# Patient Record
(demographics unavailable — no encounter records)

---

## 2017-02-02 NOTE — ERD
DATE OF SERVICE:  

 

 

HISTORY OF PRESENT ILLNESS:  The patient is a 5-month-old male coming in complaining of a cough.  Mo
ther states that he has had congestion for the past month.  There have been no signs of respiratory 
distress.  He has never had pneumonia or asthma and was born full term without complication.  No fev
ers, no sick contacts at home.  Has mild nasal congestion.

 

PAST MEDICAL HISTORY:  Denies medical problems.

 

ALLERGIES:  DENIES ALLERGIES TO MEDICATIONS.  

 

PAST SURGICAL HISTORY:  Denies.

 

IMMUNIZATIONS:  Up to date on vaccinations.

 

REVIEW OF SYSTEMS:  A 12-point review of systems was done.  Refer to HPI for positives, all other sy
stems negative.

 

PHYSICAL EXAMINATION:  

VITAL SIGNS:  Temperature is 98.9, pulse 122, respiratory rate 22, O2 saturation 98% on room air.  P
ain intensity is 0/10.

GENERAL:  The patient is well-appearing, well-nourished, no acute distress.

HEART:  Regular rate and rhythm. No murmurs, clicks, rubs or gallops.

CHEST:  Clear to auscultation bilaterally. There are no rales, wheezes or rhonchi. There is no inspi
ratory stridor or retractions. The chest wall is atraumatic. No flaring/retractions.

HEENT:  Atraumatic. Pupils equal, round and reactive to light. Extraocular muscles are grossly intac
t. There is no scleral icterus. Conjunctivae pink, no discharge. Bilateral tympanic membranes are cl
ear with no evidence of erythema, effusion or dulling of the light reflex. The oropharynx is clear w
ith no erythema or exudates and the mucosa is moist. The child is handling secretions appropriately.
 Dentition is age-appropriate and intact.

ABDOMEN:  Soft, nontender and nondistended. Bowel sounds positive. No rebound or guarding. No gross 
peritoneal signs. No Forbes or McBurney point tenderness. No gross masses.

SKIN:  There is no apparent rash, petechiae, erythema or swelling. Good skin turgor.

 

EMERGENCY ROOM COURSE:  The patient was given a breathing treatment of albuterol and Atrovent in the
 ER.  The patient also had a 1-view chest x-ray done in the ER which showed bronchiolitis or inflamm
atory airway disease, otherwise unremarkable study.  The patient was reevaluated prior to discharge,
 and he was resting comfortably, smiling, and did not show retractions or signs of troubles breathin
g.

 

DIAGNOSIS:  Bronchiolitis.

 

MEDICAL DECISION MAKING:  I have low suspicion for respiratory distress, hypoxia, or shortness of br
eath.  The patient's vital signs are stable and there are no retractions seen on the exam.  He does 
not appear cyanotic and he is responding appropriately and does not show signs of troubled breathing
.  I have low suspicion for bacterial HEENT infection, low suspicion for meningitis or sepsis.

 

DISCHARGE:  The patient was discharged with medication and  told to follow up with primary care.  Al
l other questions answered at time of discharge.  Discharge summary given at the time of departure. 
 The patient understood and complied with plan.

 

 

Dictated By: DORINA COLLINS for DEBI BROWN/CHELSEA

DD:    02/02/2017 13:38:21

DT:    02/02/2017 14:29:19

Conf#: [id number]

DID#:  [id number]

## 2017-02-02 NOTE — RADRPT
PROCEDURE:   XR Chest. 

 

CLINICAL INDICATION:   Cough.

 

TECHNIQUE:   Single frontal view. 

 

COMPARISON:   2016. 

 

FINDINGS:

There is mild bilateral perihilar interstitial disease and bronchial wall thickening consistent with
 bronchiolitis or inflammatory airways disease.  There is no focal airspace disease. 

The heart size is normal. 

There is no pleural effusion. 

There is no pneumothorax.   

 

IMPRESSION:

1.  Bronchiolitis or inflammatory airways disease.

2.  Otherwise unremarkable study.  

RPTAT: QQ

_____________________________________________ 

.Dash Alatorre MD, MD           Date    Time 

Electronically viewed and signed by .Dash Alatorre MD, MD on 02/02/2017 12:33 

 

D:  02/02/2017 12:33  T:  02/02/2017 12:33

.R/